# Patient Record
Sex: FEMALE | Race: WHITE | NOT HISPANIC OR LATINO | Employment: OTHER | URBAN - METROPOLITAN AREA
[De-identification: names, ages, dates, MRNs, and addresses within clinical notes are randomized per-mention and may not be internally consistent; named-entity substitution may affect disease eponyms.]

---

## 2022-01-14 ENCOUNTER — TELEPHONE (OUTPATIENT)
Dept: NEUROLOGY | Facility: CLINIC | Age: 72
End: 2022-01-14
Payer: MEDICARE

## 2022-01-14 ENCOUNTER — OFFICE VISIT (OUTPATIENT)
Dept: NEUROLOGY | Facility: CLINIC | Age: 72
End: 2022-01-14
Attending: PSYCHIATRY & NEUROLOGY
Payer: MEDICARE

## 2022-01-14 VITALS
BODY MASS INDEX: 38.89 KG/M2 | SYSTOLIC BLOOD PRESSURE: 160 MMHG | HEART RATE: 89 BPM | DIASTOLIC BLOOD PRESSURE: 81 MMHG | HEIGHT: 64 IN | WEIGHT: 227.8 LBS | OXYGEN SATURATION: 98 %

## 2022-01-14 DIAGNOSIS — R51.9 HEADACHE DISORDER: Primary | ICD-10-CM

## 2022-01-14 PROCEDURE — 99205 OFFICE O/P NEW HI 60 MIN: CPT | Performed by: PSYCHIATRY & NEUROLOGY

## 2022-01-14 PROCEDURE — G0463 HOSPITAL OUTPT CLINIC VISIT: HCPCS

## 2022-01-14 RX ORDER — ENALAPRIL MALEATE 20 MG/1
40 TABLET ORAL DAILY
COMMUNITY
Start: 2022-01-07

## 2022-01-14 RX ORDER — OXYBUTYNIN CHLORIDE 5 MG/1
1 TABLET ORAL 2 TIMES DAILY
COMMUNITY
Start: 2021-03-09

## 2022-01-14 RX ORDER — MONTELUKAST SODIUM 10 MG/1
1 TABLET ORAL AT BEDTIME
COMMUNITY
Start: 2021-03-09

## 2022-01-14 RX ORDER — PIOGLITAZONEHYDROCHLORIDE 45 MG/1
1 TABLET ORAL DAILY
COMMUNITY
Start: 2021-09-11

## 2022-01-14 RX ORDER — PREGABALIN 100 MG/1
100 CAPSULE ORAL DAILY
COMMUNITY
Start: 2021-07-30

## 2022-01-14 RX ORDER — AMOXICILLIN 500 MG/1
2000 CAPSULE ORAL PRN
COMMUNITY
Start: 2020-05-06

## 2022-01-14 RX ORDER — FUROSEMIDE 20 MG
20 TABLET ORAL DAILY
COMMUNITY
Start: 2022-01-07

## 2022-01-14 RX ORDER — DULOXETIN HYDROCHLORIDE 60 MG/1
120 CAPSULE, DELAYED RELEASE ORAL DAILY
COMMUNITY
Start: 2021-03-09

## 2022-01-14 RX ORDER — ATORVASTATIN CALCIUM 40 MG/1
1 TABLET, FILM COATED ORAL AT BEDTIME
COMMUNITY
Start: 2021-10-01

## 2022-01-14 ASSESSMENT — MIFFLIN-ST. JEOR: SCORE: 1528.29

## 2022-01-14 NOTE — PROGRESS NOTES
"Berenice Estevez is a 71 year old female who presents for:  Chief Complaint   Patient presents with     Consult     for headaches         Initial Vitals:  BP (!) 156/73 (BP Location: Right arm, Patient Position: Sitting, Cuff Size: Adult Large)   Pulse 91   Ht 1.626 m (5' 4\")   Wt 103.3 kg (227 lb 12.8 oz)   SpO2 97%   BMI 39.10 kg/m   Estimated body mass index is 39.1 kg/m  as calculated from the following:    Height as of this encounter: 1.626 m (5' 4\").    Weight as of this encounter: 103.3 kg (227 lb 12.8 oz).. Body surface area is 2.16 meters squared. BP completed using cuff size: large    Nursing Comments: 2nd BP- 160/81 Patient has been only taking a half dose of BP meds due to an issue with getting refills, she will be able to start back on full dose today and was advised to follow up with her primary care   Kassy Torres  "

## 2022-01-14 NOTE — TELEPHONE ENCOUNTER
Due to snow and slippery conditions tried to reach out to patient to see if they are still planning to come today. Phone rang and patient picked up but hung up as soon as I said is this Berenice

## 2022-01-14 NOTE — PATIENT INSTRUCTIONS
AFTER VISIT SUMMARY (AVS):    At today's visit we thoroughly discussed various diagnostic possibilities for your symptoms, possible future evaluation (cervical spine MRI), available treatment options, and the plan.    We discussed that bilateral occipital nerve block with steroids might be helpful to alleviate your symptoms.  We also discussed that you could increase the dose of Lyrica to 100 mg twice daily if tolerated.  Regular exercises to strengthen your neck muscles would be helpful as well.    Please reduce the use of Tylenol to less than 15 days/month to address rebound headaches.    In addition, we discussed the importance of ergonomic positioning of your head during the day and proper sleeping position at night to prevent recurrence.    Next follow-up appointment is in the next 3 months or earlier if needed.    Please do not hesitate to call me with any questions or concerns.    Thanks.

## 2022-01-14 NOTE — PROGRESS NOTES
INITIAL NEUROLOGY CONSULTATION    DATE OF VISIT: 1/14/2022  CLINIC LOCATION: [facility]  MRN: [id number]  PATIENT NAME: Berenice Estevez  YOB: 1950    PRIMARY CARE PROVIDER: Riley Bauer     REASON FOR VISIT:   Chief Complaint   Patient presents with     Consult     for headaches      HISTORY OF PRESENT ILLNESS:                                                    Ms. Berenice Estevez is 71 year old right handed female patient with past medical history of hypertension, hyperlipidemia, diabetes mellitus type 2, and depression, who was seen today for headache.    Per patient's report, she developed persistent bioccipital headaches with sharp pains in her eyebrows/temples when she turns her head.  They started after a fall in early December 2020, during which she hit the back of her head.      At the present time she has almost constant bioccipital ache that increases in severity 2-3 times per day.  There could be also sharp pain in her eye or ear on either side 1-2 times per day that lasts seconds.  Associated symptoms include occasional nausea, increased irritability, and neck tension.  She denies any additional focal neurological symptoms aside from her known peripheral neuropathy secondary to diabetes.  She reports several previous head injuries, but denies history of seizures or CNS infections.    Household chores, trying to concentrate, bending over and walking up and down stairs make her symptoms worse.  No additional associated symptoms, aggravating or alleviating factors.  She is on 120 mg of Cymbalta and 100 mg of Lyrica daily.  She takes 500 to 1000 mg of Tylenol every night since June or July 2021.    The patient was seen at local neurology office ([facility]) in July 2021.  The presentation felt consistent with bilateral occipital neuralgia, tension headache and myofascial pain.  She was referred to physical therapy.  She also had bilateral occipital nerve blocks  with lidocaine and trigger point injections.  The effect lasted approximately 2 weeks.  Dr. Rivas recommended to increase her dose of Lyrica to 100 mg twice daily, but the patient was not aware of it.    According to Care Everywhere, head CT from 12/8/2020, performed after a fall with head injury, was negative for acute intracranial findings, but demonstrated brain atrophy and presumed chronic microvascular ischemic changes.    Latest hemoglobin A1C (7.4) is from August 2021.  Urine drug screen was negative in July 2021.  Vitamin B12 was 931 in April 2021.  Methylmalonic acid level, vitamin D (39.3), and TSH (3.81) were normal at that time.    Review of Systems - the patient endorses insomnia, fatigue, swollen feet, heartburn, stomach pain, left eye blindness, anxiety, depression, urinary incontinence, itching, arthritis/joint swelling, muscle tenderness, feet numbness, forgetfulness, and easy skin bruising.  All of them were previously discussed with other medical providers. Otherwise, she denies any other complaints on 14-point comprehensive review of systems.  PAST MEDICAL/SURGICAL HISTORY:                                                    I personally reviewed patient's past medical and surgical history with the patient at today's visit.  MEDICATIONS:                                                    I personally reviewed patient's medications and allergies with the patient at today's visit.  amoxicillin (AMOXIL) 500 MG capsule, Take 2,000 mg by mouth as needed TAKE ONE OUR PRIOR TO DENTAL PROCEDURES  aspirin (ASA) 81 MG EC tablet, Take 81 mg by mouth daily  atorvastatin (LIPITOR) 40 MG tablet, Take 1 tablet by mouth At Bedtime  DULoxetine (CYMBALTA) 60 MG capsule, Take 120 mg by mouth daily 2 CAPS (120 MG)  BY MOUTH  enalapril (VASOTEC) 20 MG tablet, Take 40 mg by mouth daily  furosemide (LASIX) 20 MG tablet, Take 20 mg by mouth daily  metFORMIN (GLUCOPHAGE) 500 MG tablet, Take 1,000 mg by mouth 2 times daily  "(with meals)  montelukast (SINGULAIR) 10 MG tablet, Take 1 tablet by mouth At Bedtime  omeprazole (PRILOSEC) 20 MG DR capsule, Take 20 mg by mouth daily  oxybutynin (DITROPAN) 5 MG tablet, Take 1 tablet by mouth 2 times daily  pioglitazone (ACTOS) 45 MG tablet, Take 1 tablet by mouth daily  pregabalin (LYRICA) 100 MG capsule, Take 100 mg by mouth daily    No current facility-administered medications on file prior to visit.    ALLERGIES:                                                    Not on File  FAMILY/SOCIAL HISTORY:                                                    Family and social history was reviewed with the patient at today's visit.  Her brother had stroke.  There is also positive family history of peripheral polyneuropathy.  Former smoker, quit in 1979.  Denies current alcohol and recreational drug use.  .  Works part-time as .  REVIEW OF SYSTEMS:                                                    Patient has completed a [facility] Patient Health History, including a 14-system review, which was personally reviewed, and pertinent positives are listed in HPI. She denies any additional problems on the further questioning.  EXAM:                                                    VITAL SIGNS:   BP (!) 156/73 (BP Location: Right arm, Patient Position: Sitting, Cuff Size: Adult Large)   Pulse 91   Ht 1.626 m (5' 4\")   Wt 103.3 kg (227 lb 12.8 oz)   SpO2 97%   BMI 39.10 kg/m    Mini-Cog Assessment:  Mini Cog Assessment  Clock Draw Score: 2 Normal  3 Item Recall: 3 objects recalled  Mini Cog Total Score: 5  Administered by: : Kassy BROWN    General: pt is in NAD, cooperative.  Skin: normal turgor, moist mucous membranes, no lesions/rashes noticed.  HEENT: ATNC, EOMI, PERRL, white sclera, normal conjunctiva, no nystagmus or ptosis. No carotid bruits bilaterally.  Respiratory: lung sounds clear to auscultation bilaterally, no crackles, wheezes, rhonchi. Symmetric lung excursion, no " accessory respiratory muscle use.  Cardiovascular: normal S1/S2, no murmurs/rubs/gallops.   Abdomen: Not distended.  : deferred.    Neurological:  Mental: alert, follows commands, Mini Cog Total Score: 5/5 with 3/3 on memory recall, no aphasia or dysarthria. Fund of knowledge is appropriate for age.  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 Visual field of the right eye is intact, fundi: disc is sharp, no papilledema and normal vessels on the right, optic atrophy on the left.  CN III, IV, VI: EOM intact, right pupil reactive, left pupil is sluggish  CN V: facial sensation nl  CN VII: face symmetric, no facial droop  CN VIII: hearing normal  CN IX: palate elevation symmetric, uvula at midline  CN XI SCM normal, shoulder shrug nl  CN XII: tongue midline  Motor: Strength: 5/5 in all major groups of all extremities. Normal tone. No abnormal movements. No pronator drift b/l.  Reflexes: Triceps, biceps, brachioradialis, and patellar reflexes normal and symmetric, achilles reflexes are reduced bilaterally. No clonus noted. Toes are down-going b/l.   Sensory: light touch, pinprick, and vibration slightly reduced in both feet. Romberg: negative.  Coordination: FNF and heel-shin tests intact b/l.   Gait:  Normal, able to tandem walk mild difficulty.  There is tenderness to palpation over the greater and lesser occipital nerves bilaterally.  DATA:   LABS/IMAGING/OTHER STUDIES: I reviewed pertinent medical records, including prior neurology notes and other pertinent information in Care Everywhere, as detailed in the history of present illness.  ASSESSMENT AND PLAN:      ASSESSMENT: Berenice Estevez is a 71 year old female patient with listed above past medical history, who presents with persistent bioccipital headache since a fall in December 2020.    We had a detailed discussion with the patient regarding her presenting complaints.   The neurological exam today is suggestive of peripheral polyneuropathy, likely related to her diabetes, but unrelated to her main symptom.  Head CT in December 2020 was unrevealing.    I reviewed with the patient that her headaches are likely multifactorial, including cervicogenic, tension, bilateral occipital neuralgia, and possibly medication overuse components.  We discussed available treatment options and the plan, as summarized below.    Berenice to follow up with Primary Care provider regarding elevated blood pressure.     DIAGNOSES:    ICD-10-CM    1. Headache disorder  R51.9      PLAN: At today's visit we thoroughly discussed various diagnostic possibilities for patient's symptoms, possible future evaluation (cervical spine MRI if symptoms persist after occipital nerve blocks with steroids), available treatment options, and the plan.    We discussed that bilateral occipital nerve block with steroids might be helpful to alleviate her symptoms.  She would like to do it locally scheduled with primary care provider.  We also discussed that she could increase the dose of Lyrica to 100 mg twice daily if tolerated.  Regular exercises to strengthen neck muscles would be helpful as well.    I advised the patient to reduce the use of Tylenol to less than 15 days/month to address rebound headaches.    In addition, we discussed the importance of ergonomic positioning of her head during the day and proper sleeping position at night to prevent recurrence.    Next follow-up appointment is in the next 3 months or earlier if needed.    Total Time: 62 minutes spent on the date of the encounter doing chart review, history and exam, documentation and further activities per the note.    Jassi Stoll MD  [facility]  (Chart documentation was completed in part with Dragon voice-recognition software. Even though reviewed, some grammatical, spelling, and word errors may remain.)

## 2022-01-14 NOTE — LETTER
1/14/2022         RE: Berenice Estevez  [address]  [place] [address]        Dear Colleague,    Thank you for referring your patient, Berenice Estevez, to the [facility] [place]. Please see a copy of my visit note below.    INITIAL NEUROLOGY CONSULTATION    DATE OF VISIT: 1/14/2022  CLINIC LOCATION: [facility]  MRN: [id number]  PATIENT NAME: Bereince Estevez  YOB: 1950    PRIMARY CARE PROVIDER: Riley Bauer     REASON FOR VISIT:   Chief Complaint   Patient presents with     Consult     for headaches      HISTORY OF PRESENT ILLNESS:                                                    Ms. Berenice Estevez is 71 year old right handed female patient with past medical history of hypertension, hyperlipidemia, diabetes mellitus type 2, and depression, who was seen today for headache.    Per patient's report, she developed persistent bioccipital headaches with sharp pains in her eyebrows/temples when she turns her head.  They started after a fall in early December 2020, during which she hit the back of her head.      At the present time she has almost constant bioccipital ache that increases in severity 2-3 times per day.  There could be also sharp pain in her eye or ear on either side 1-2 times per day that lasts seconds.  Associated symptoms include occasional nausea, increased irritability, and neck tension.  She denies any additional focal neurological symptoms aside from her known peripheral neuropathy secondary to diabetes.  She reports several previous head injuries, but denies history of seizures or CNS infections.    Household chores, trying to concentrate, bending over and walking up and down stairs make her symptoms worse.  No additional associated symptoms, aggravating or alleviating factors.  She is on 120 mg of Cymbalta and 100 mg of Lyrica daily.  She takes 500 to 1000 mg of Tylenol every night since June or July 2021.    The patient was seen  at local neurology office ([facility]) in July 2021.  The presentation felt consistent with bilateral occipital neuralgia, tension headache and myofascial pain.  She was referred to physical therapy.  She also had bilateral occipital nerve blocks with lidocaine and trigger point injections.  The effect lasted approximately 2 weeks.  Dr. Rivas recommended to increase her dose of Lyrica to 100 mg twice daily, but the patient was not aware of it.    According to Care Everywhere, head CT from 12/8/2020, performed after a fall with head injury, was negative for acute intracranial findings, but demonstrated brain atrophy and presumed chronic microvascular ischemic changes.    Latest hemoglobin A1C (7.4) is from August 2021.  Urine drug screen was negative in July 2021.  Vitamin B12 was 931 in April 2021.  Methylmalonic acid level, vitamin D (39.3), and TSH (3.81) were normal at that time.    Review of Systems - the patient endorses insomnia, fatigue, swollen feet, heartburn, stomach pain, left eye blindness, anxiety, depression, urinary incontinence, itching, arthritis/joint swelling, muscle tenderness, feet numbness, forgetfulness, and easy skin bruising.  All of them were previously discussed with other medical providers. Otherwise, she denies any other complaints on 14-point comprehensive review of systems.  PAST MEDICAL/SURGICAL HISTORY:                                                    I personally reviewed patient's past medical and surgical history with the patient at today's visit.  MEDICATIONS:                                                    I personally reviewed patient's medications and allergies with the patient at today's visit.  amoxicillin (AMOXIL) 500 MG capsule, Take 2,000 mg by mouth as needed TAKE ONE OUR PRIOR TO DENTAL PROCEDURES  aspirin (ASA) 81 MG EC tablet, Take 81 mg by mouth daily  atorvastatin (LIPITOR) 40 MG tablet, Take 1 tablet by mouth At Bedtime  DULoxetine (CYMBALTA) 60 MG  "capsule, Take 120 mg by mouth daily 2 CAPS (120 MG)  BY MOUTH  enalapril (VASOTEC) 20 MG tablet, Take 40 mg by mouth daily  furosemide (LASIX) 20 MG tablet, Take 20 mg by mouth daily  metFORMIN (GLUCOPHAGE) 500 MG tablet, Take 1,000 mg by mouth 2 times daily (with meals)  montelukast (SINGULAIR) 10 MG tablet, Take 1 tablet by mouth At Bedtime  omeprazole (PRILOSEC) 20 MG DR capsule, Take 20 mg by mouth daily  oxybutynin (DITROPAN) 5 MG tablet, Take 1 tablet by mouth 2 times daily  pioglitazone (ACTOS) 45 MG tablet, Take 1 tablet by mouth daily  pregabalin (LYRICA) 100 MG capsule, Take 100 mg by mouth daily    No current facility-administered medications on file prior to visit.    ALLERGIES:                                                    Not on File  FAMILY/SOCIAL HISTORY:                                                    Family and social history was reviewed with the patient at today's visit.  Her brother had stroke.  There is also positive family history of peripheral polyneuropathy.  Former smoker, quit in 1979.  Denies current alcohol and recreational drug use.  .  Works part-time as .  REVIEW OF SYSTEMS:                                                    Patient has completed a [facility] Patient Health History, including a 14-system review, which was personally reviewed, and pertinent positives are listed in HPI. She denies any additional problems on the further questioning.  EXAM:                                                    VITAL SIGNS:   BP (!) 156/73 (BP Location: Right arm, Patient Position: Sitting, Cuff Size: Adult Large)   Pulse 91   Ht 1.626 m (5' 4\")   Wt 103.3 kg (227 lb 12.8 oz)   SpO2 97%   BMI 39.10 kg/m    Mini-Cog Assessment:  Mini Cog Assessment  Clock Draw Score: 2 Normal  3 Item Recall: 3 objects recalled  Mini Cog Total Score: 5  Administered by: : Kassy BROWN    General: pt is in NAD, cooperative.  Skin: normal turgor, moist mucous membranes, no " lesions/rashes noticed.  HEENT: ATNC, EOMI, PERRL, white sclera, normal conjunctiva, no nystagmus or ptosis. No carotid bruits bilaterally.  Respiratory: lung sounds clear to auscultation bilaterally, no crackles, wheezes, rhonchi. Symmetric lung excursion, no accessory respiratory muscle use.  Cardiovascular: normal S1/S2, no murmurs/rubs/gallops.   Abdomen: Not distended.  : deferred.    Neurological:  Mental: alert, follows commands, Mini Cog Total Score: 5/5 with 3/3 on memory recall, no aphasia or dysarthria. Fund of knowledge is appropriate for age.  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 Visual field of the right eye is intact, fundi: disc is sharp, no papilledema and normal vessels on the right, optic atrophy on the left.  CN III, IV, VI: EOM intact, right pupil reactive, left pupil is sluggish  CN V: facial sensation nl  CN VII: face symmetric, no facial droop  CN VIII: hearing normal  CN IX: palate elevation symmetric, uvula at midline  CN XI SCM normal, shoulder shrug nl  CN XII: tongue midline  Motor: Strength: 5/5 in all major groups of all extremities. Normal tone. No abnormal movements. No pronator drift b/l.  Reflexes: Triceps, biceps, brachioradialis, and patellar reflexes normal and symmetric, achilles reflexes are reduced bilaterally. No clonus noted. Toes are down-going b/l.   Sensory: light touch, pinprick, and vibration slightly reduced in both feet. Romberg: negative.  Coordination: FNF and heel-shin tests intact b/l.   Gait:  Normal, able to tandem walk mild difficulty.  There is tenderness to palpation over the greater and lesser occipital nerves bilaterally.  DATA:   LABS/IMAGING/OTHER STUDIES: I reviewed pertinent medical records, including prior neurology notes and other pertinent information in Care Everywhere, as detailed in the history of present illness.  ASSESSMENT AND PLAN:       ASSESSMENT: Berenice Estevez is a 71 year old female patient with listed above past medical history, who presents with persistent bioccipital headache since a fall in December 2020.    We had a detailed discussion with the patient regarding her presenting complaints.  The neurological exam today is suggestive of peripheral polyneuropathy, likely related to her diabetes, but unrelated to her main symptom.  Head CT in December 2020 was unrevealing.    I reviewed with the patient that her headaches are likely multifactorial, including cervicogenic, tension, bilateral occipital neuralgia, and possibly medication overuse components.  We discussed available treatment options and the plan, as summarized below.    Berenice to follow up with Primary Care provider regarding elevated blood pressure.     DIAGNOSES:    ICD-10-CM    1. Headache disorder  R51.9      PLAN: At today's visit we thoroughly discussed various diagnostic possibilities for patient's symptoms, possible future evaluation (cervical spine MRI if symptoms persist after occipital nerve blocks with steroids), available treatment options, and the plan.    We discussed that bilateral occipital nerve block with steroids might be helpful to alleviate her symptoms.  She would like to do it locally scheduled with primary care provider.  We also discussed that she could increase the dose of Lyrica to 100 mg twice daily if tolerated.  Regular exercises to strengthen neck muscles would be helpful as well.    I advised the patient to reduce the use of Tylenol to less than 15 days/month to address rebound headaches.    In addition, we discussed the importance of ergonomic positioning of her head during the day and proper sleeping position at night to prevent recurrence.    Next follow-up appointment is in the next 3 months or earlier if needed.    Total Time: 62 minutes spent on the date of the encounter doing chart review, history and exam, documentation and further  "activities per the note.    Jassi Stoll MD  [facility]  (Chart documentation was completed in part with Dragon voice-recognition software. Even though reviewed, some grammatical, spelling, and word errors may remain.)            Berenice Estevez is a 71 year old female who presents for:  Chief Complaint   Patient presents with     Consult     for headaches         Initial Vitals:  BP (!) 156/73 (BP Location: Right arm, Patient Position: Sitting, Cuff Size: Adult Large)   Pulse 91   Ht 1.626 m (5' 4\")   Wt 103.3 kg (227 lb 12.8 oz)   SpO2 97%   BMI 39.10 kg/m   Estimated body mass index is 39.1 kg/m  as calculated from the following:    Height as of this encounter: 1.626 m (5' 4\").    Weight as of this encounter: 103.3 kg (227 lb 12.8 oz).. Body surface area is 2.16 meters squared. BP completed using cuff size: large    Nursing Comments: 2nd BP-     Kassy Torres      Again, thank you for allowing me to participate in the care of your patient.        Sincerely,        Jassi Stoll MD    "